# Patient Record
Sex: MALE | Race: OTHER
[De-identification: names, ages, dates, MRNs, and addresses within clinical notes are randomized per-mention and may not be internally consistent; named-entity substitution may affect disease eponyms.]

---

## 2017-06-24 ENCOUNTER — HOSPITAL ENCOUNTER (EMERGENCY)
Dept: HOSPITAL 72 - EMR | Age: 37
Discharge: HOME | End: 2017-06-24
Payer: MEDICAID

## 2017-06-24 VITALS — SYSTOLIC BLOOD PRESSURE: 156 MMHG | DIASTOLIC BLOOD PRESSURE: 84 MMHG

## 2017-06-24 VITALS — DIASTOLIC BLOOD PRESSURE: 88 MMHG | SYSTOLIC BLOOD PRESSURE: 170 MMHG

## 2017-06-24 VITALS — DIASTOLIC BLOOD PRESSURE: 79 MMHG | SYSTOLIC BLOOD PRESSURE: 166 MMHG

## 2017-06-24 VITALS — SYSTOLIC BLOOD PRESSURE: 145 MMHG | DIASTOLIC BLOOD PRESSURE: 80 MMHG

## 2017-06-24 VITALS — WEIGHT: 238 LBS | BODY MASS INDEX: 37.35 KG/M2 | HEIGHT: 67 IN

## 2017-06-24 VITALS — DIASTOLIC BLOOD PRESSURE: 80 MMHG | SYSTOLIC BLOOD PRESSURE: 145 MMHG

## 2017-06-24 VITALS — SYSTOLIC BLOOD PRESSURE: 177 MMHG | DIASTOLIC BLOOD PRESSURE: 85 MMHG

## 2017-06-24 DIAGNOSIS — T38.3X5A: ICD-10-CM

## 2017-06-24 DIAGNOSIS — I10: Primary | ICD-10-CM
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
## 2017-06-24 LAB
ALBUMIN/GLOB SERPL: 1.3 {RATIO} (ref 1–2.7)
ALT SERPL-CCNC: 53 U/L (ref 3–41)
ANION GAP SERPL CALC-SCNC: 15 MMOL/L (ref 5–15)
AST SERPL-CCNC: 54 U/L (ref 5–40)
BASOPHILS NFR BLD AUTO: 1.9 % (ref 0–2)
CALCIUM SERPL-MCNC: 9.4 MG/DL (ref 8.6–10.2)
CHLORIDE SERPL-SCNC: 100 MEQ/L (ref 98–107)
CO2 SERPL-SCNC: 26 MEQ/L (ref 20–30)
CREAT SERPL-MCNC: 0.9 MG/DL (ref 0.7–1.2)
EOSINOPHIL NFR BLD AUTO: 4.4 % (ref 0–3)
ERYTHROCYTE [DISTWIDTH] IN BLOOD BY AUTOMATED COUNT: 12.3 % (ref 11.6–14.8)
GFR SERPLBLD BASED ON 1.73 SQ M-ARVRAT: > 60 ML/MIN (ref 60–?)
GLOBULIN SER-MCNC: 3.2 G/DL
HEMOLYSIS: 12
LYMPHOCYTES NFR BLD AUTO: 22.1 % (ref 20–45)
MCH RBC QN AUTO: 31.8 PG (ref 27–31)
MCHC RBC AUTO-ENTMCNC: 34.2 G/DL (ref 32–36)
MCV RBC AUTO: 93 FL (ref 80–99)
MONOCYTES NFR BLD AUTO: 9.8 % (ref 1–10)
NEUTROPHILS NFR BLD AUTO: 61.8 % (ref 45–75)
PLATELET # BLD: 156 K/UL (ref 150–450)
PMV BLD AUTO: 10.2 FL (ref 6.5–10.1)
POTASSIUM SERPL-SCNC: 3.5 MEQ/L (ref 3.4–4.9)
PROT SERPL-MCNC: 7.6 G/DL (ref 6.6–8.7)
RBC # BLD AUTO: 4.58 M/UL (ref 4.7–6.1)
SODIUM SERPL-SCNC: 141 MEQ/L (ref 135–145)
TROPONIN I SERPL-MCNC: < 0.3 NG/ML (ref ?–0.3)
WBC # BLD AUTO: 8.1 K/UL (ref 4.8–10.8)

## 2017-06-24 PROCEDURE — 71010: CPT

## 2017-06-24 PROCEDURE — 93005 ELECTROCARDIOGRAM TRACING: CPT

## 2017-06-24 PROCEDURE — 82962 GLUCOSE BLOOD TEST: CPT

## 2017-06-24 PROCEDURE — 36415 COLL VENOUS BLD VENIPUNCTURE: CPT

## 2017-06-24 PROCEDURE — 80053 COMPREHEN METABOLIC PANEL: CPT

## 2017-06-24 PROCEDURE — 85025 COMPLETE CBC W/AUTO DIFF WBC: CPT

## 2017-06-24 PROCEDURE — 84484 ASSAY OF TROPONIN QUANT: CPT

## 2017-06-24 PROCEDURE — 82550 ASSAY OF CK (CPK): CPT

## 2017-06-24 PROCEDURE — 99283 EMERGENCY DEPT VISIT LOW MDM: CPT

## 2017-06-24 NOTE — EMERGENCY ROOM REPORT
History of Present Illness


General


Chief Complaint:  Hypertension





Present Illness


HPI


The patient is feeling stressed it with pain in the back of his head.  He found 

his blood pressure was elevated.  He treated himself by taking his wife's 

diabetic medications which are metformin 1000 mg and glipizide 4 mg.  He 

decided he needed to be seen.





The patient denies any chest pain, shortness of breath, nausea, vomiting, 

diarrhea, change in vision, sweating, weakness, anxiety.





The patient is not treated for hypertension at this time.





Wife reports glucose of 421 at home before giving meds.


Allergies:  


Coded Allergies:  


     No Known Allergies (Unverified , 6/24/17)





Patient History


Past Medical History:  see triage record


Social History Narrative





Reviewed Nursing Documentation:  PMH: Agreed, PSxH: Agreed





Review of Systems


All Other Systems:  negative except mentioned in HPI





Physical Exam





Vital Signs








  Date Time  Temp Pulse Resp B/P Pulse Ox O2 Delivery O2 Flow Rate FiO2


 


6/24/17 19:35 99.1 98 18 199/99 98 Room Air  








Sp02 EP Interpretation:  reviewed, normal


General Appearance:  well appearing, no apparent distress, GCS 15


Head:  normocephalic


Eyes:  bilateral eye PERRL, bilateral eye normal inspection


ENT:  moist mucus membranes


Neck:  supple


Respiratory:  lungs clear, normal breath sounds


Cardiovascular #1:  regular rate, rhythm


Cardiovascular #2:  2+ radial (R)


Gastrointestinal:  normal inspection, normal bowel sounds, non tender, no mass, 

non-distended


Musculoskeletal:  back normal, gait/station normal, normal range of motion


Neurologic:  alert, oriented x3, grossly normal


Psychiatric:  mood/affect normal


Skin:  normal inspection, warm/dry, other - sun burn





Medical Decision Making


Diagnostic Impression:  


 Primary Impression:  


 Hypertension


 Additional Impression:  


 Unintentional medication ingestion


ER Course


The patient presents with the back of his head pain and high blood pressure and 

after taking his wife's and diabetic medications.  Differential includes 

essential hypertension, hypertensive urgency, impending hypoglycemia, headache 

amongst others.  Is a nonfocal neurologic exam at this time.  Evaluation will 

be with EKG, chest x-ray and labs including an Accu-Chek.  Will be followed his 

blood pressure and glucose.


EKG Diagnostic Results


Rate:  normal


Rhythm:  NSR


ST Segments:  no acute changes





Rhythm Strip Diag. Results


EP Interpretation:  yes


Rhythm:  NSR, no PVC's, no ectopy


Status:  improved


Disposition:  HOME, SELF-CARE


Condition:  Improved











Adi,Isiah M.D. Jun 24, 2017 19:49

## 2017-06-25 NOTE — DIAGNOSTIC IMAGING REPORT
Indication: Chest pain



Technique:  Single portable AP view of the chest.



Findings:



Comparison:  None.



The bones and extra pulmonary soft tissues, cardiomediastinal silhouette, 

pulmonary

vasculature and parenchyma, and pleural surfaces are unremarkable.



IMPRESSION:



Negative portable AP chest.

## 2017-06-26 NOTE — CARDIOLOGY REPORT
--------------- APPROVED REPORT --------------





EKG Measurement

Heart [id number]

[id number]

[id number]

[id number]

[id number]





Normal sinus rhythm

Possible Left atrial enlargement

Borderline ECG

## 2018-10-24 ENCOUNTER — HOSPITAL ENCOUNTER (EMERGENCY)
Dept: HOSPITAL 72 - EMR | Age: 38
Discharge: HOME | End: 2018-10-24
Payer: MEDICAID

## 2018-10-24 VITALS — BODY MASS INDEX: 36.68 KG/M2 | WEIGHT: 242 LBS | HEIGHT: 68 IN

## 2018-10-24 VITALS — DIASTOLIC BLOOD PRESSURE: 78 MMHG | SYSTOLIC BLOOD PRESSURE: 155 MMHG

## 2018-10-24 VITALS — SYSTOLIC BLOOD PRESSURE: 155 MMHG | DIASTOLIC BLOOD PRESSURE: 83 MMHG

## 2018-10-24 DIAGNOSIS — I10: Primary | ICD-10-CM

## 2018-10-24 PROCEDURE — 99282 EMERGENCY DEPT VISIT SF MDM: CPT

## 2018-10-24 NOTE — EMERGENCY ROOM REPORT
History of Present Illness


General


Chief Complaint:  Hypertension


Source:  Patient





Present Illness


HPI


Patient is a 38-year-old male presented after increased blood pressure.  

Patient had recently been seen by his primary provider.  He was noted to have 

elevated blood pressure and was advised to increase the amount of 

hydrochlorothiazide he was taking.  The patient had been noted to have 

increased stress after the death of his brother.  He denies any fever.  He 

denies any severe headache or chest discomfort.  He reports having increased 

alcohol use this weekend.  He denies any abdominal pain


Allergies:  


Coded Allergies:  


     No Known Allergies (Unverified , 6/24/17)





Patient History


Past Medical History:  see triage record


Reviewed Nursing Documentation:  PMH: Agreed; PSxH: Agreed





Nursing Documentation-PMH


Hx Hypertension:  Yes





Review of Systems


All Other Systems:  negative except mentioned in HPI





Physical Exam





Vital Signs








  Date Time  Temp Pulse Resp B/P (MAP) Pulse Ox O2 Delivery O2 Flow Rate FiO2


 


10/24/18 21:09 98.6 99 16 211/104 99 Room Air  








Sp02 EP Interpretation:  reviewed, normal


General Appearance:  normal inspection, well appearing, no apparent distress, 

alert, GCS 15


Head:  atraumatic


ENT:  normal ENT inspection, hearing grossly normal, normal voice


Neck:  normal inspection, full range of motion, supple, no bony tend


Respiratory:  normal inspection, lungs clear, normal breath sounds, no 

respiratory distress, no retraction, no wheezing


Cardiovascular #1:  regular rate, rhythm, no edema


Gastrointestinal:  normal inspection, normal bowel sounds, non tender, soft, no 

guarding, no hernia


Genitourinary:  no CVA tenderness


Musculoskeletal:  normal inspection, back normal, normal range of motion


Neurologic:  normal inspection, alert, oriented x3, responsive, CNs III-XII nml 

as tested, speech normal


Psychiatric:  normal inspection, judgement/insight normal, mood/affect normal


Skin:  normal inspection, normal color, no rash





Medical Decision Making


Diagnostic Impression:  


 Primary Impression:  


 Hypertension


ER Course


Patient presented for elevated blood pressure.  The differential diagnosis 

included was not limited to medication noncompliance, anxiety, alcohol 

withdrawal among others.  Patient has a benign exam and does not appear to 

require any further imaging or laboratory testing at this time.  Patient was 

given Norvasc .  The patient appears to have marked improvement in his blood 

pressure with reassurance.  The patient was advised follow-up with his primary 

care physician for recheck.  The patient was advised dietary modification.  The 

patient's blood sugar was noted be minimally elevated due to recent soda.  The 

patient was advised low sugar diet. The patient is advised to follow up with  

primary care doctor in 1-2 days.  Patient is advised to return if any worsening 

condition or if any changes in status that are concerning.





This report is dictated with Dragon transcription software which may 

occasionally lead to discrepancies related to use of this software.





Last Vital Signs








  Date Time  Temp Pulse Resp B/P (MAP) Pulse Ox O2 Delivery O2 Flow Rate FiO2


 


10/24/18 22:04 98.0 77 16 155/83 98 Room Air  








Status:  unchanged


Disposition:  HOME, SELF-CARE


Condition:  Stable


Referrals:  


NON PHYSICIAN (PCP)


Patient Instructions:  DASH Eating Plan











Iban Hicks MD Oct 24, 2018 22:22

## 2019-01-29 ENCOUNTER — HOSPITAL ENCOUNTER (EMERGENCY)
Dept: HOSPITAL 72 - EMR | Age: 39
Discharge: HOME | End: 2019-01-29
Payer: MEDICAID

## 2019-01-29 VITALS — SYSTOLIC BLOOD PRESSURE: 155 MMHG | DIASTOLIC BLOOD PRESSURE: 84 MMHG

## 2019-01-29 VITALS — HEIGHT: 67 IN | WEIGHT: 240 LBS | BODY MASS INDEX: 37.67 KG/M2

## 2019-01-29 VITALS — DIASTOLIC BLOOD PRESSURE: 84 MMHG | SYSTOLIC BLOOD PRESSURE: 155 MMHG

## 2019-01-29 DIAGNOSIS — I10: Primary | ICD-10-CM

## 2019-01-29 LAB
ADD MANUAL DIFF: NO
ALBUMIN SERPL-MCNC: 4.3 G/DL (ref 3.4–5)
ALBUMIN/GLOB SERPL: 1 {RATIO} (ref 1–2.7)
ALP SERPL-CCNC: 69 U/L (ref 46–116)
ALT SERPL-CCNC: 33 U/L (ref 12–78)
ANION GAP SERPL CALC-SCNC: 8 MMOL/L (ref 5–15)
AST SERPL-CCNC: 19 U/L (ref 15–37)
BASOPHILS NFR BLD AUTO: 1.5 % (ref 0–2)
BILIRUB SERPL-MCNC: 0.7 MG/DL (ref 0.2–1)
BUN SERPL-MCNC: 12 MG/DL (ref 7–18)
CALCIUM SERPL-MCNC: 9.6 MG/DL (ref 8.5–10.1)
CHLORIDE SERPL-SCNC: 100 MMOL/L (ref 98–107)
CO2 SERPL-SCNC: 28 MMOL/L (ref 21–32)
CREAT SERPL-MCNC: 1.2 MG/DL (ref 0.55–1.3)
EOSINOPHIL NFR BLD AUTO: 4 % (ref 0–3)
ERYTHROCYTE [DISTWIDTH] IN BLOOD BY AUTOMATED COUNT: 12 % (ref 11.6–14.8)
GLOBULIN SER-MCNC: 4.1 G/DL
HCT VFR BLD CALC: 49.1 % (ref 42–52)
HGB BLD-MCNC: 17 G/DL (ref 14.2–18)
LYMPHOCYTES NFR BLD AUTO: 19.9 % (ref 20–45)
MCV RBC AUTO: 87 FL (ref 80–99)
MONOCYTES NFR BLD AUTO: 10 % (ref 1–10)
NEUTROPHILS NFR BLD AUTO: 64.6 % (ref 45–75)
PLATELET # BLD: 200 K/UL (ref 150–450)
POTASSIUM SERPL-SCNC: 3.4 MMOL/L (ref 3.5–5.1)
RBC # BLD AUTO: 5.65 M/UL (ref 4.7–6.1)
SODIUM SERPL-SCNC: 136 MMOL/L (ref 136–145)
WBC # BLD AUTO: 7.2 K/UL (ref 4.8–10.8)

## 2019-01-29 PROCEDURE — 93005 ELECTROCARDIOGRAM TRACING: CPT

## 2019-01-29 PROCEDURE — 85025 COMPLETE CBC W/AUTO DIFF WBC: CPT

## 2019-01-29 PROCEDURE — 36415 COLL VENOUS BLD VENIPUNCTURE: CPT

## 2019-01-29 PROCEDURE — 99283 EMERGENCY DEPT VISIT LOW MDM: CPT

## 2019-01-29 PROCEDURE — 80053 COMPREHEN METABOLIC PANEL: CPT

## 2019-01-29 NOTE — EMERGENCY ROOM REPORT
History of Present Illness


General


Chief Complaint:  Hypertension


Source:  Patient





Present Illness


HPI


Patient has history hypertension.  He is compliant with medications.  He states 

that his blood pressure was severely elevated 180 yesterday.  He also complains 

of some numbness of his face today although that is much improved.  He denies 

any chest pain shortness breath denies any weakness.  No other complaints are 

noted.  He just wanted to make sure it was okay.No other modifying factors.  No 

other associated signs and symptoms.  No other complaints were noted.


Allergies:  


Coded Allergies:  


     No Known Allergies (Unverified , 1/29/19)





Patient History


Past Medical History:  HTN


Past Surgical History:  none


Pertinent Family History:  none


Social History:  Denies: smoking, alcohol use, drug use


Reviewed Nursing Documentation:  PMH: Agreed; PSxH: Agreed





Nursing Documentation-PMH


Past Medical History:  No History, Except For


Hx Hypertension:  Yes





Review of Systems


All Other Systems:  negative except mentioned in HPI





Physical Exam





Vital Signs








  Date Time  Temp Pulse Resp B/P (MAP) Pulse Ox O2 Delivery O2 Flow Rate FiO2


 


1/29/19 10:59 98.2 71 16 155/87 97 Room Air  








Sp02 EP Interpretation:  reviewed, normal


General Appearance:  normal inspection, well appearing, no apparent distress, 

alert


Head:  atraumatic


Eyes:  bilateral eye normal inspection


ENT:  normal ENT inspection, hearing grossly normal, normal voice


Neck:  normal inspection, full range of motion, supple, no bony tend


Respiratory:  normal inspection, lungs clear, normal breath sounds, no 

respiratory distress, no retraction, no wheezing


Cardiovascular #1:  regular rate, rhythm, no edema


Gastrointestinal:  normal inspection, normal bowel sounds, non tender, soft, no 

guarding, no hernia


Genitourinary:  no CVA tenderness


Musculoskeletal:  normal inspection, back normal, normal range of motion


Neurologic:  normal inspection, alert, responsive, speech normal


Psychiatric:  normal inspection, judgement/insight normal, mood/affect normal


Skin:  normal inspection, normal color, no rash





Medical Decision Making


Diagnostic Impression:  


 Primary Impression:  


 Hypertension


ER Course


Patient presents emergency department today complaining elevated blood pressure 

yesterday but blood pressure significantly improved today.  Patient is 

compliant medication has a good supply.  He was complains of numbness in his 

face that appears to have resolved.  Differential considerations include CVA, 

Bell's palsy, hypertension, anxiety just name a few.  Patient's exam is fairly 

benign but given patient's presentation we'll obtain EKG and laboratory workup.

  Patient was monitored in emergency Department did not have any further 

episodes.  Given the patient had negative workup appears felt the patient 

discharged home.  This could also be early Bell's palsy recommend patient 

return emergency room if there is any weakness of his face.Patient is advised 

to follow up with primary doctor in 2-3 days and return the emergency room for 

any worsening symptoms and as needed.





Labs








Test


  1/29/19


11:49


 


White Blood Count


  7.2 K/UL


(4.8-10.8)


 


Red Blood Count


  5.65 M/UL


(4.70-6.10)


 


Hemoglobin


  17.0 G/DL


(14.2-18.0)


 


Hematocrit


  49.1 %


(42.0-52.0)


 


Mean Corpuscular Volume 87 FL (80-99) 


 


Mean Corpuscular Hemoglobin


  30.1 PG


(27.0-31.0)


 


Mean Corpuscular Hemoglobin


Concent 34.6 G/DL


(32.0-36.0)


 


Red Cell Distribution Width


  12.0 %


(11.6-14.8)


 


Platelet Count


  200 K/UL


(150-450)


 


Mean Platelet Volume


  10.0 FL


(6.5-10.1)


 


Neutrophils (%) (Auto)


  64.6 %


(45.0-75.0)


 


Lymphocytes (%) (Auto)


  19.9 %


(20.0-45.0)


 


Monocytes (%) (Auto)


  10.0 %


(1.0-10.0)


 


Eosinophils (%) (Auto)


  4.0 %


(0.0-3.0)


 


Basophils (%) (Auto)


  1.5 %


(0.0-2.0)


 


Sodium Level


  136 MMOL/L


(136-145)


 


Potassium Level


  3.4 MMOL/L


(3.5-5.1)


 


Chloride Level


  100 MMOL/L


()


 


Carbon Dioxide Level


  28 MMOL/L


(21-32)


 


Anion Gap


  8 mmol/L


(5-15)


 


Blood Urea Nitrogen


  12 mg/dL


(7-18)


 


Creatinine


  1.2 MG/DL


(0.55-1.30)


 


Estimat Glomerular Filtration


Rate > 60 mL/min


(>60)


 


Glucose Level


  114 MG/DL


()


 


Calcium Level


  9.6 MG/DL


(8.5-10.1)


 


Total Bilirubin


  0.7 MG/DL


(0.2-1.0)


 


Aspartate Amino Transf


(AST/SGOT) 19 U/L (15-37) 


 


 


Alanine Aminotransferase


(ALT/SGPT) 33 U/L (12-78) 


 


 


Alkaline Phosphatase


  69 U/L


()


 


Total Protein


  8.4 G/DL


(6.4-8.2)


 


Albumin


  4.3 G/DL


(3.4-5.0)


 


Globulin 4.1 g/dL 


 


Albumin/Globulin Ratio 1.0 (1.0-2.7) 








EKG Diagnostic Results


Rate:  normal


Rhythm:  NSR


ST Segments:  no acute changes





Rhythm Strip Diag. Results


EP Interpretation:  yes


Rate:  71


Rhythm:  NSR, no PVC's, no ectopy





Last Vital Signs








  Date Time  Temp Pulse Resp B/P (MAP) Pulse Ox O2 Delivery O2 Flow Rate FiO2


 


1/29/19 13:44 98.6  16 155/84 96 Room Air  


 


1/29/19 11:14  71      








Status:  improved


Disposition:  HOME, SELF-CARE


Condition:  Stable


Referrals:  


NON PHYSICIAN (PCP)


Patient Instructions:  Hypertension











João Brito MD Jan 29, 2019 16:47

## 2019-01-29 NOTE — NUR
ED Nurse Note:





patient walked into Ed c/o high blood pressure since yestderday, per patient it 
was >200 SBP. 

Checked Vital signs: B 155/85 vss. see flowsheet. 

c/o neck and facial discomfort, but pt stated that it's not painful. denies CP.

## 2019-01-29 NOTE — NUR
ED Nurse Note:

patient is being discharged cleared by ERMD. discharge paper/instruction given 
to the patient, patient verbalized understanding. patient a/o x4, ambulated out 
of Ed with steady gait, with all belongings. ID band removed.

## 2019-01-30 NOTE — CARDIOLOGY REPORT
--------------- APPROVED REPORT --------------





EKG Measurement

Heart [id number]

[id number]

[id number]

[id number]

[id number]





Normal sinus rhythm

Normal ECG